# Patient Record
Sex: FEMALE | Race: WHITE
[De-identification: names, ages, dates, MRNs, and addresses within clinical notes are randomized per-mention and may not be internally consistent; named-entity substitution may affect disease eponyms.]

---

## 2017-01-11 ENCOUNTER — HOSPITAL ENCOUNTER (OUTPATIENT)
Dept: HOSPITAL 80 - FIMAGING | Age: 72
End: 2017-01-11
Attending: GENERAL ACUTE CARE HOSPITAL
Payer: COMMERCIAL

## 2017-01-11 DIAGNOSIS — Z12.31: Primary | ICD-10-CM

## 2017-01-11 PROCEDURE — G0202 SCR MAMMO BI INCL CAD: HCPCS

## 2017-01-11 NOTE — MA
Screening Digital Mammogram With iCAD, January 11, 2017

 

Indication: Routine screening. Previous left breast surgery. No personal history of breast cancer.

 

Technique: Standard cephalocaudal and mediolateral oblique projections are obtained.  This examinatio
n is processed by the iCAD computer-aided detection system. 

 

Comparison: December 2015, November 2014, November 2013, November 2012, and November 2011.

 

Breast density: Type B.

 

Findings: CAD was reviewed. No suspicious microcalcifications, mass, or architectural distortion.

 

Impression: Negative mammogram. BI-RADS 1. 

 

Recommendation: Routine screening is recommended in one year.

 

[facility]–1983 will send a result letter to the patient.

 

Negative mammography should not preclude additional workup of a clinically suspicious finding.

 

The patient's information is entered into a reminder system with a target due date for her next mammo
gram.

## 2017-09-06 ENCOUNTER — HOSPITAL ENCOUNTER (OUTPATIENT)
Dept: HOSPITAL 80 - BMCIMAGING | Age: 72
End: 2017-09-06
Attending: FAMILY MEDICINE
Payer: COMMERCIAL

## 2017-09-06 DIAGNOSIS — M19.042: ICD-10-CM

## 2017-09-06 DIAGNOSIS — M19.041: Primary | ICD-10-CM

## 2018-01-12 ENCOUNTER — HOSPITAL ENCOUNTER (OUTPATIENT)
Dept: HOSPITAL 80 - BMCIMAGING | Age: 73
End: 2018-01-12
Attending: FAMILY MEDICINE
Payer: COMMERCIAL

## 2018-01-12 DIAGNOSIS — Z12.31: Primary | ICD-10-CM

## 2019-01-14 ENCOUNTER — HOSPITAL ENCOUNTER (OUTPATIENT)
Dept: HOSPITAL 80 - BMCIMAGING | Age: 74
End: 2019-01-14
Attending: FAMILY MEDICINE
Payer: COMMERCIAL

## 2019-01-14 DIAGNOSIS — Z12.31: Primary | ICD-10-CM

## 2019-01-21 ENCOUNTER — HOSPITAL ENCOUNTER (OUTPATIENT)
Dept: HOSPITAL 80 - BMCIMAGING | Age: 74
End: 2019-01-21
Attending: FAMILY MEDICINE
Payer: COMMERCIAL

## 2019-01-21 DIAGNOSIS — E28.39: ICD-10-CM

## 2019-01-21 DIAGNOSIS — M81.0: Primary | ICD-10-CM

## 2019-01-21 DIAGNOSIS — Z78.0: ICD-10-CM
